# Patient Record
Sex: MALE | Race: OTHER | NOT HISPANIC OR LATINO
[De-identification: names, ages, dates, MRNs, and addresses within clinical notes are randomized per-mention and may not be internally consistent; named-entity substitution may affect disease eponyms.]

---

## 2021-05-06 ENCOUNTER — APPOINTMENT (OUTPATIENT)
Dept: INTERNAL MEDICINE | Facility: CLINIC | Age: 76
End: 2021-05-06
Payer: MEDICARE

## 2021-05-06 VITALS
DIASTOLIC BLOOD PRESSURE: 78 MMHG | HEART RATE: 68 BPM | WEIGHT: 120 LBS | SYSTOLIC BLOOD PRESSURE: 140 MMHG | TEMPERATURE: 97.9 F | OXYGEN SATURATION: 97 % | HEIGHT: 70 IN | BODY MASS INDEX: 17.18 KG/M2

## 2021-05-06 DIAGNOSIS — K22.70 BARRETT'S ESOPHAGUS W/OUT DYSPLASIA: ICD-10-CM

## 2021-05-06 DIAGNOSIS — Z87.891 PERSONAL HISTORY OF NICOTINE DEPENDENCE: ICD-10-CM

## 2021-05-06 DIAGNOSIS — Z80.9 FAMILY HISTORY OF MALIGNANT NEOPLASM, UNSPECIFIED: ICD-10-CM

## 2021-05-06 DIAGNOSIS — Z78.9 OTHER SPECIFIED HEALTH STATUS: ICD-10-CM

## 2021-05-06 DIAGNOSIS — Z86.79 PERSONAL HISTORY OF OTHER DISEASES OF THE CIRCULATORY SYSTEM: ICD-10-CM

## 2021-05-06 PROCEDURE — 93000 ELECTROCARDIOGRAM COMPLETE: CPT

## 2021-05-06 PROCEDURE — 99204 OFFICE O/P NEW MOD 45 MIN: CPT

## 2021-05-06 RX ORDER — TIZANIDINE 2 MG/1
2 TABLET ORAL
Refills: 0 | Status: ACTIVE | COMMUNITY

## 2021-05-06 NOTE — PHYSICAL EXAM
[No Edema] : there was no peripheral edema [Soft] : abdomen soft [Non Tender] : non-tender [Normal] : affect was normal and insight and judgment were intact [de-identified] : ostomy with urine in place  normal stoma   [de-identified] : kyphosis present

## 2021-05-06 NOTE — REVIEW OF SYSTEMS
[Hearing Loss] : hearing loss [Easy Bruising] : easy bruising [Negative] : Psychiatric [FreeTextEntry8] : has an ostomy

## 2021-05-06 NOTE — HISTORY OF PRESENT ILLNESS
[de-identified] : This is a 77 yo M with hx of atrial fibrillation (s/p ablation), bladder ca with ostomy (2018). Prostate CA s/p seed implant. 2010\par Hx of barretts esophagus - following with Dr Mendieta\par LBP- takes tizanadine and melloxicam\par \par Onc  Dr Cristi Summers.   s/p chemo and immunotherapy. part of a clinical trial.  \par , worked in finance, worked for [employer]\par exercises daily. no issues with sob or cp.\par Quit tob year his son was born.  Used to drink heavily.\par Constipation - takes miralax and metamucil\par Altagracia Askew  -[phone] - performs the labs

## 2021-05-06 NOTE — PLAN
[FreeTextEntry1] : 75 yo M with hx of AF, bladder ca, prostate ca\par labs to be done with his oncologist\par EKG NSR\par \par once labs reviewed - to send report to Dr Mendieta.  \par try to limit Meloxicam use.\par continue PT for his back pain\par Get old records for review

## 2021-05-14 ENCOUNTER — NON-APPOINTMENT (OUTPATIENT)
Age: 76
End: 2021-05-14

## 2021-06-02 ENCOUNTER — NON-APPOINTMENT (OUTPATIENT)
Age: 76
End: 2021-06-02

## 2021-06-03 ENCOUNTER — NON-APPOINTMENT (OUTPATIENT)
Age: 76
End: 2021-06-03

## 2021-06-15 ENCOUNTER — APPOINTMENT (OUTPATIENT)
Dept: INTERNAL MEDICINE | Facility: CLINIC | Age: 76
End: 2021-06-15
Payer: MEDICARE

## 2021-06-15 VITALS
HEIGHT: 70 IN | TEMPERATURE: 97.4 F | BODY MASS INDEX: 16.46 KG/M2 | WEIGHT: 115 LBS | DIASTOLIC BLOOD PRESSURE: 66 MMHG | OXYGEN SATURATION: 97 % | HEART RATE: 76 BPM | SYSTOLIC BLOOD PRESSURE: 118 MMHG

## 2021-06-15 DIAGNOSIS — R21 RASH AND OTHER NONSPECIFIC SKIN ERUPTION: ICD-10-CM

## 2021-06-15 DIAGNOSIS — K59.09 OTHER CONSTIPATION: ICD-10-CM

## 2021-06-15 PROCEDURE — 99214 OFFICE O/P EST MOD 30 MIN: CPT

## 2021-06-15 RX ORDER — MUPIROCIN 20 MG/G
2 OINTMENT TOPICAL 3 TIMES DAILY
Qty: 1 | Refills: 2 | Status: ACTIVE | COMMUNITY
Start: 2021-06-15 | End: 1900-01-01

## 2021-06-15 NOTE — PHYSICAL EXAM
[No Edema] : there was no peripheral edema [Soft] : abdomen soft [Non Tender] : non-tender [Normal] : affect was normal and insight and judgment were intact [de-identified] : ostomy with urine in place  normal stoma   [de-identified] : kyphosis present [de-identified] : left inner calf 1 cm area of erythema with central white raised clearing

## 2021-06-15 NOTE — HISTORY OF PRESENT ILLNESS
[de-identified] : This is a 75 yo M with hx of atrial fibrillation (s/p ablation), bladder ca with ostomy (2018). Prostate CA s/p seed implant. 2010\par Hx of barretts esophagus - \par colonoscopy performed with Dr Mendieta- has known diverticulitis. has been stable-  repeat in 5-10 years.\par Wife has mild neurocognitive d/o- being treated at [facility].\par recently more fatigue.  \par recent labs from 5/20 reviewed - only out of range was Cr of 1.33\par walking and using some weights - BUT wieght has gone down.  10 lbs over one year.\par At [place] clinical trial with Dr Summers.  had chemo and imunotherapy.\par taking miralax daily for constipation and metamucil.\par

## 2021-06-15 NOTE — PLAN
[FreeTextEntry1] : 77 yo M with hx of AF, bladder ca, prostate ca\par with weight loss  - add one can ensure to regimen daily.  \par \par Labs reviewed.\par Constipation- cont miralax and metamucil\par leg cramping - continue magnseium oxide\par lbp- cont tizanidine prn\par \par continue PT for his back pain\par \par left leg rash0 trial of mupirocin and f/up Dr Javier dermatologist.

## 2021-10-11 ENCOUNTER — APPOINTMENT (OUTPATIENT)
Dept: INTERNAL MEDICINE | Facility: CLINIC | Age: 76
End: 2021-10-11
Payer: MEDICARE

## 2021-10-11 VITALS
DIASTOLIC BLOOD PRESSURE: 66 MMHG | SYSTOLIC BLOOD PRESSURE: 126 MMHG | HEIGHT: 70 IN | HEART RATE: 74 BPM | OXYGEN SATURATION: 96 % | TEMPERATURE: 98.2 F

## 2021-10-11 DIAGNOSIS — Z23 ENCOUNTER FOR IMMUNIZATION: ICD-10-CM

## 2021-10-11 DIAGNOSIS — Z85.46 PERSONAL HISTORY OF MALIGNANT NEOPLASM OF PROSTATE: ICD-10-CM

## 2021-10-11 PROCEDURE — 99212 OFFICE O/P EST SF 10 MIN: CPT

## 2021-10-11 PROCEDURE — G0008: CPT

## 2021-10-11 PROCEDURE — 90662 IIV NO PRSV INCREASED AG IM: CPT

## 2021-10-11 NOTE — HISTORY OF PRESENT ILLNESS
[de-identified] : This is a 75 yo M with hx of atrial fibrillation (s/p ablation), bladder ca with ostomy (2018). Prostate CA s/p seed implant. 2010. neurocognitive d/o\par Hx of barretts esophagus - \par \par here for fluvax\par \par

## 2021-12-01 ENCOUNTER — NON-APPOINTMENT (OUTPATIENT)
Age: 76
End: 2021-12-01

## 2022-04-06 ENCOUNTER — APPOINTMENT (OUTPATIENT)
Dept: INTERNAL MEDICINE | Facility: CLINIC | Age: 77
End: 2022-04-06
Payer: MEDICARE

## 2022-04-06 VITALS
SYSTOLIC BLOOD PRESSURE: 136 MMHG | OXYGEN SATURATION: 96 % | BODY MASS INDEX: 18.96 KG/M2 | TEMPERATURE: 98.2 F | WEIGHT: 118 LBS | DIASTOLIC BLOOD PRESSURE: 78 MMHG | HEART RATE: 74 BPM | HEIGHT: 66 IN

## 2022-04-06 DIAGNOSIS — Z00.00 ENCOUNTER FOR GENERAL ADULT MEDICAL EXAMINATION W/OUT ABNORMAL FINDINGS: ICD-10-CM

## 2022-04-06 DIAGNOSIS — M54.9 DORSALGIA, UNSPECIFIED: ICD-10-CM

## 2022-04-06 PROCEDURE — 36415 COLL VENOUS BLD VENIPUNCTURE: CPT

## 2022-04-06 PROCEDURE — G0439: CPT

## 2022-04-06 PROCEDURE — 93000 ELECTROCARDIOGRAM COMPLETE: CPT

## 2022-04-06 NOTE — PHYSICAL EXAM
[No Edema] : there was no peripheral edema [Soft] : abdomen soft [Non Tender] : non-tender [Normal] : affect was normal and insight and judgment were intact [de-identified] : ostomy with urine in place  normal stoma   [de-identified] : kyphosis present [de-identified] : left inner calf 1 cm area of erythema with central white raised clearing

## 2022-04-06 NOTE — REVIEW OF SYSTEMS
[Fatigue] : fatigue [Hearing Loss] : hearing loss [Easy Bruising] : easy bruising [Negative] : Psychiatric [FreeTextEntry4] : as above [FreeTextEntry8] : has an ostomy

## 2022-04-06 NOTE — HISTORY OF PRESENT ILLNESS
[de-identified] : This is a 75 yo M with hx of atrial fibrillation (s/p ablation), bladder ca with ostomy (2018). Prostate CA s/p seed implant. 2010. neurocognitive d/o\par Hx of barretts esophagus - \par Fatigue -  - last 2 months - not sleeping well, wife with dementia - wanders and gets up, disrupting sleep\par  = some bowie.  no cp.  last saw cardiologist- Dr Villeda - 2+ years.  \par LBP- chronic.  poor posture.   - goes to gym 2 times a week.    uses elliptical - but due to wife's condiiton bowie snot get out as often\par Stinging on his tongue at night\par \par Onc Dr Cristi Summers. s/p chemo and immunotherapy. part of a clinical trial. 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

## 2022-04-06 NOTE — PLAN
[FreeTextEntry1] : 75 yo M with hx of AF, bladder ca, prostate ca\par EKG NSR\par labs today\par \par LBP - encourage more routine exercise and f/up for PT\par Fatigue- check labs today\par  fup Dr Tate at [facility]\par \par Tongue stinging - referral for ENT  \par left leg rash0 trial of mupirocin and f/up Dr Javier dermatologist.

## 2022-04-07 ENCOUNTER — NON-APPOINTMENT (OUTPATIENT)
Age: 77
End: 2022-04-07

## 2022-04-07 LAB
ALBUMIN SERPL ELPH-MCNC: 4.7 G/DL
ALP BLD-CCNC: 111 U/L
ALT SERPL-CCNC: 9 U/L
ANION GAP SERPL CALC-SCNC: 13 MMOL/L
APPEARANCE: CLEAR
AST SERPL-CCNC: 12 U/L
BASOPHILS # BLD AUTO: 0.05 K/UL
BASOPHILS NFR BLD AUTO: 0.6 %
BILIRUB SERPL-MCNC: 0.6 MG/DL
BILIRUBIN URINE: NEGATIVE
BLOOD URINE: NEGATIVE
BUN SERPL-MCNC: 32 MG/DL
CALCIUM SERPL-MCNC: 9.8 MG/DL
CHLORIDE SERPL-SCNC: 107 MMOL/L
CHOLEST SERPL-MCNC: 162 MG/DL
CO2 SERPL-SCNC: 22 MMOL/L
COLOR: NORMAL
CREAT SERPL-MCNC: 1.48 MG/DL
EGFR: 48 ML/MIN/1.73M2
EOSINOPHIL # BLD AUTO: 0.47 K/UL
EOSINOPHIL NFR BLD AUTO: 6 %
ESTIMATED AVERAGE GLUCOSE: 111 MG/DL
GLUCOSE QUALITATIVE U: NEGATIVE
GLUCOSE SERPL-MCNC: 98 MG/DL
HBA1C MFR BLD HPLC: 5.5 %
HCT VFR BLD CALC: 38.4 %
HDLC SERPL-MCNC: 51 MG/DL
HGB BLD-MCNC: 12.3 G/DL
IMM GRANULOCYTES NFR BLD AUTO: 0.5 %
KETONES URINE: NEGATIVE
LDLC SERPL CALC-MCNC: 99 MG/DL
LEUKOCYTE ESTERASE URINE: ABNORMAL
LYMPHOCYTES # BLD AUTO: 1.98 K/UL
LYMPHOCYTES NFR BLD AUTO: 25.5 %
MAN DIFF?: NORMAL
MCHC RBC-ENTMCNC: 32 GM/DL
MCHC RBC-ENTMCNC: 32.2 PG
MCV RBC AUTO: 100.5 FL
MONOCYTES # BLD AUTO: 0.74 K/UL
MONOCYTES NFR BLD AUTO: 9.5 %
NEUTROPHILS # BLD AUTO: 4.49 K/UL
NEUTROPHILS NFR BLD AUTO: 57.9 %
NITRITE URINE: NEGATIVE
NONHDLC SERPL-MCNC: 111 MG/DL
PH URINE: 8.5
PLATELET # BLD AUTO: 220 K/UL
POTASSIUM SERPL-SCNC: 4.7 MMOL/L
PROT SERPL-MCNC: 7.8 G/DL
PROTEIN URINE: ABNORMAL
RBC # BLD: 3.82 M/UL
RBC # FLD: 12.9 %
SODIUM SERPL-SCNC: 141 MMOL/L
SPECIFIC GRAVITY URINE: 1.02
TRIGL SERPL-MCNC: 62 MG/DL
TSH SERPL-ACNC: 2.41 UIU/ML
UROBILINOGEN URINE: NORMAL
WBC # FLD AUTO: 7.77 K/UL

## 2022-04-11 ENCOUNTER — LABORATORY RESULT (OUTPATIENT)
Age: 77
End: 2022-04-11

## 2022-04-14 ENCOUNTER — APPOINTMENT (OUTPATIENT)
Dept: OTOLARYNGOLOGY | Facility: CLINIC | Age: 77
End: 2022-04-14
Payer: MEDICARE

## 2022-04-14 VITALS
TEMPERATURE: 98.3 F | HEART RATE: 72 BPM | SYSTOLIC BLOOD PRESSURE: 134 MMHG | RESPIRATION RATE: 16 BRPM | HEIGHT: 66 IN | OXYGEN SATURATION: 99 % | DIASTOLIC BLOOD PRESSURE: 79 MMHG | WEIGHT: 118 LBS | BODY MASS INDEX: 18.96 KG/M2

## 2022-04-14 PROCEDURE — 99203 OFFICE O/P NEW LOW 30 MIN: CPT

## 2022-04-14 NOTE — PHYSICAL EXAM
[Normal] : assessment of respiratory effort is normal [de-identified] : looks like he is biting his tongue - slight divot with submucosal hemorrhage r lateral tongue 3 mm area [de-identified] : some implants some posts some missing teeth [de-identified] : gait steady

## 2022-04-14 NOTE — ASSESSMENT
[FreeTextEntry1] : It apepars he is biting his tongue in his sleep\par advised to try to change sleeping position, hydrate and see dentist to complete/adjust dental work\par rtc 1 mo to recheck - does not look neoplastic and it is not raised, but if persists may need bx

## 2022-04-14 NOTE — HISTORY OF PRESENT ILLNESS
[de-identified] : 76 yo m with r tongue pain intermittently for a month. It happens when he is sleeping - it comes on sharply, wakes him up and cools down over 1-2 minutes. He is a nonsmoker x 45 yrs. 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 No inciting event. He has h/o extensive dental work, implants and 2 exposed posts on posterior mandible on r. used peridex with no improvement. no fh or sh relevant to cc

## 2022-04-18 ENCOUNTER — RESULT REVIEW (OUTPATIENT)
Age: 77
End: 2022-04-18

## 2022-04-18 ENCOUNTER — RX RENEWAL (OUTPATIENT)
Age: 77
End: 2022-04-18

## 2022-04-18 LAB
ALBUMIN MFR SERPL ELPH: 54.6 %
ALBUMIN SERPL-MCNC: 4.3 G/DL
ALBUMIN/GLOB SERPL: 1.2 RATIO
ALBUPE: 17.3 %
ALPHA1 GLOB MFR SERPL ELPH: 4.5 %
ALPHA1 GLOB SERPL ELPH-MCNC: 0.4 G/DL
ALPHA1UPE: 18.8 %
ALPHA2 GLOB MFR SERPL ELPH: 10 %
ALPHA2 GLOB SERPL ELPH-MCNC: 0.8 G/DL
ALPHA2UPE: 27 %
ANION GAP SERPL CALC-SCNC: 15 MMOL/L
APPEARANCE: ABNORMAL
B-GLOBULIN MFR SERPL ELPH: 11.9 %
B-GLOBULIN SERPL ELPH-MCNC: 0.9 G/DL
BETAUPE: 14.5 %
BILIRUBIN URINE: NEGATIVE
BLOOD URINE: NEGATIVE
BUN SERPL-MCNC: 26 MG/DL
CALCIUM SERPL-MCNC: 9.8 MG/DL
CHLORIDE SERPL-SCNC: 104 MMOL/L
CO2 SERPL-SCNC: 22 MMOL/L
COLOR: YELLOW
CREAT 24H UR-MCNC: NORMAL G/24 H
CREAT SERPL-MCNC: 1.55 MG/DL
CREAT SPEC-SCNC: 125 MG/DL
CREAT/PROT UR: 0.4 RATIO
CREATININE UR (MAYO): 126 MG/DL
EGFR: 46 ML/MIN/1.73M2
GAMMA GLOB FLD ELPH-MCNC: 1.5 G/DL
GAMMA GLOB MFR SERPL ELPH: 19 %
GAMMAUPE: 22.4 %
GLUCOSE QUALITATIVE U: NEGATIVE
GLUCOSE SERPL-MCNC: 104 MG/DL
IGA 24H UR QL IFE: NORMAL
INTERPRETATION SERPL IEP-IMP: NORMAL
KAPPA LC 24H UR QL: NORMAL
KETONES URINE: NEGATIVE
LEUKOCYTE ESTERASE URINE: ABNORMAL
M PROTEIN SPEC IFE-MCNC: NORMAL
NITRITE URINE: NEGATIVE
PH URINE: 9
POTASSIUM SERPL-SCNC: 4.6 MMOL/L
PROT PATTERN 24H UR ELPH-IMP: NORMAL
PROT SERPL-MCNC: 7.8 G/DL
PROT SERPL-MCNC: 7.8 G/DL
PROT UR-MCNC: 45 MG/DL
PROT UR-MCNC: 51 MG/DL
PROT UR-MCNC: 51 MG/DL
PROTEIN URINE: ABNORMAL
SODIUM SERPL-SCNC: 141 MMOL/L
SPECIFIC GRAVITY URINE: 1.02
SPECIMEN VOL 24H UR: NORMAL ML
UROBILINOGEN URINE: NORMAL

## 2022-05-17 ENCOUNTER — APPOINTMENT (OUTPATIENT)
Dept: ULTRASOUND IMAGING | Facility: HOSPITAL | Age: 77
End: 2022-05-17
Payer: MEDICARE

## 2022-05-17 ENCOUNTER — OUTPATIENT (OUTPATIENT)
Dept: OUTPATIENT SERVICES | Facility: HOSPITAL | Age: 77
LOS: 1 days | End: 2022-05-17
Payer: MEDICARE

## 2022-05-17 PROCEDURE — 76775 US EXAM ABDO BACK WALL LIM: CPT

## 2022-05-17 PROCEDURE — 76775 US EXAM ABDO BACK WALL LIM: CPT | Mod: 26

## 2022-05-19 ENCOUNTER — NON-APPOINTMENT (OUTPATIENT)
Age: 77
End: 2022-05-19

## 2022-05-19 RX ORDER — MELOXICAM 15 MG/1
15 TABLET ORAL
Qty: 30 | Refills: 5 | Status: DISCONTINUED | COMMUNITY
Start: 1900-01-01 | End: 2022-05-19

## 2022-05-23 ENCOUNTER — APPOINTMENT (OUTPATIENT)
Dept: OTOLARYNGOLOGY | Facility: CLINIC | Age: 77
End: 2022-05-23
Payer: MEDICARE

## 2022-05-23 VITALS
DIASTOLIC BLOOD PRESSURE: 73 MMHG | RESPIRATION RATE: 16 BRPM | BODY MASS INDEX: 19.29 KG/M2 | HEIGHT: 66 IN | TEMPERATURE: 98.2 F | HEART RATE: 91 BPM | WEIGHT: 120 LBS | SYSTOLIC BLOOD PRESSURE: 148 MMHG | OXYGEN SATURATION: 98 %

## 2022-05-23 DIAGNOSIS — H61.20 IMPACTED CERUMEN, UNSPECIFIED EAR: ICD-10-CM

## 2022-05-23 PROCEDURE — 99213 OFFICE O/P EST LOW 20 MIN: CPT | Mod: 25

## 2022-05-23 PROCEDURE — 69210 REMOVE IMPACTED EAR WAX UNI: CPT

## 2022-05-23 NOTE — PHYSICAL EXAM
[Normal] : lingual tonsils are normal [Midline] : trachea located in midline position [de-identified] : b copious cerumen removed atraumatically with suction, TMs intact  [de-identified] : improvement, no longer biting tongue  [de-identified] : some implants some missing teeth  [de-identified] : gait steady

## 2022-05-23 NOTE — ASSESSMENT
[FreeTextEntry1] : 1. glossitis\par -improved\par -rec he continues to stay well hydrated\par 2. b cerumen impaction \par -cerumen removed\par -ears feel better\par -Avoid Qtip usage\par -debrox drops, instructed how to use \par RTC as needed

## 2022-05-23 NOTE — HISTORY OF PRESENT ILLNESS
[de-identified] : 1 month follow up for this 76 y/o M with r tongue pain. He is c/o occasional tongue pain improved since last visit but not 100%. It has woken him up twice since his initial visit, which he states is an improvement. He has been trying to stay well hydrated. He has stage three kidney disease. He saw dentist who shaved down one of his teeth and the episodes  of pain decreased significantly.

## 2022-05-25 ENCOUNTER — APPOINTMENT (OUTPATIENT)
Dept: OTOLARYNGOLOGY | Facility: CLINIC | Age: 77
End: 2022-05-25
Payer: MEDICARE

## 2022-05-25 VITALS
HEART RATE: 75 BPM | RESPIRATION RATE: 18 BRPM | SYSTOLIC BLOOD PRESSURE: 144 MMHG | WEIGHT: 120 LBS | HEIGHT: 66 IN | TEMPERATURE: 98.4 F | BODY MASS INDEX: 19.29 KG/M2 | DIASTOLIC BLOOD PRESSURE: 74 MMHG | OXYGEN SATURATION: 100 %

## 2022-05-25 DIAGNOSIS — K14.0 GLOSSITIS: ICD-10-CM

## 2022-05-25 PROCEDURE — 99213 OFFICE O/P EST LOW 20 MIN: CPT

## 2022-05-25 NOTE — ASSESSMENT
[FreeTextEntry1] : exam is nl but from history it appears he may be biting anterior tongue rather than posterior\par advised to see dentist again for mouth guard\par he agreed\par asked to followup if it recurs with guard in place

## 2022-05-25 NOTE — HISTORY OF PRESENT ILLNESS
[de-identified] : 2 d followup for this 78 yo man who it appeared had been biting tongue waking him up at night - had tooth filed down and it improved. he called and said it recurred yesterday so he was asked to come in. -f.s.c

## 2022-06-24 ENCOUNTER — APPOINTMENT (OUTPATIENT)
Dept: INTERNAL MEDICINE | Facility: CLINIC | Age: 77
End: 2022-06-24
Payer: MEDICARE

## 2022-06-24 ENCOUNTER — APPOINTMENT (OUTPATIENT)
Dept: INTERNAL MEDICINE | Facility: CLINIC | Age: 77
End: 2022-06-24

## 2022-06-24 VITALS
BODY MASS INDEX: 18.96 KG/M2 | RESPIRATION RATE: 18 BRPM | HEART RATE: 69 BPM | DIASTOLIC BLOOD PRESSURE: 80 MMHG | TEMPERATURE: 97.6 F | SYSTOLIC BLOOD PRESSURE: 140 MMHG | OXYGEN SATURATION: 97 % | HEIGHT: 66 IN | WEIGHT: 118 LBS

## 2022-06-24 PROCEDURE — 99213 OFFICE O/P EST LOW 20 MIN: CPT

## 2022-06-24 RX ORDER — OMEPRAZOLE 20 MG/1
20 TABLET, DELAYED RELEASE ORAL
Refills: 0 | Status: DISCONTINUED | COMMUNITY
End: 2022-06-24

## 2022-06-24 NOTE — HISTORY OF PRESENT ILLNESS
[de-identified] : This is a 78 yo M with hx of atrial fibrillation (s/p ablation), bladder ca with ostomy (2018). Prostate CA s/p seed implant. 2010. partial right nephrectomy for RCC,  CKD 3\par \par recent BRANNON  with imrpvoement\par imaging revealed mild hydro\par \par seeing Dr Villeda - cardiology next week\par  has forwarded US to his urologist.\par Onc Dr Cristi Summers. s/p chemo and immunotherapy. part of a clinical trial. 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

## 2022-06-24 NOTE — PLAN
[FreeTextEntry1] : CKD 3 - stable - for repeat las in 3--4 months\par mild right hydro -deepak f/up with

## 2022-08-17 ENCOUNTER — APPOINTMENT (OUTPATIENT)
Dept: INTERNAL MEDICINE | Facility: CLINIC | Age: 77
End: 2022-08-17

## 2023-02-02 ENCOUNTER — APPOINTMENT (OUTPATIENT)
Dept: INTERNAL MEDICINE | Facility: CLINIC | Age: 78
End: 2023-02-02
Payer: MEDICARE

## 2023-02-02 VITALS
HEIGHT: 67 IN | BODY MASS INDEX: 17.89 KG/M2 | WEIGHT: 114 LBS | SYSTOLIC BLOOD PRESSURE: 134 MMHG | DIASTOLIC BLOOD PRESSURE: 78 MMHG

## 2023-02-02 DIAGNOSIS — R26.89 OTHER ABNORMALITIES OF GAIT AND MOBILITY: ICD-10-CM

## 2023-02-02 DIAGNOSIS — N18.30 CHRONIC KIDNEY DISEASE, STAGE 3 UNSPECIFIED: ICD-10-CM

## 2023-02-02 DIAGNOSIS — R53.82 CHRONIC FATIGUE, UNSPECIFIED: ICD-10-CM

## 2023-02-02 DIAGNOSIS — C67.9 MALIGNANT NEOPLASM OF BLADDER, UNSPECIFIED: ICD-10-CM

## 2023-02-02 PROCEDURE — 36415 COLL VENOUS BLD VENIPUNCTURE: CPT

## 2023-02-02 PROCEDURE — 99214 OFFICE O/P EST MOD 30 MIN: CPT

## 2023-02-02 NOTE — HISTORY OF PRESENT ILLNESS
[de-identified] : This is a 78 yo M with hx of atrial fibrillation (s/p ablation), bladder ca with ostomy (2018). Prostate CA s/p seed implant. 2010. partial right nephrectomy for RCC,  CKD 3\par - fatigue remains an issue - taking care fo his wife who has dementia.- both of them not sleeping., not going to gym, does not want to leave her alone.\par - she is following with neuro at [facility]\par - Memory concens- he is also seeing neuro at [facility] seeing Dr Villeda - cardiology - no issues\par  urologist.Dr Irving fax [phone]\par Onc Dr Cristi Summers. s/p chemo and immunotherapy. part of a clinical trial. 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

## 2023-02-02 NOTE — REVIEW OF SYSTEMS
[Fatigue] : fatigue [Hearing Loss] : hearing loss [Easy Bruising] : easy bruising [Negative] : Psychiatric [Memory Loss] : memory loss [FreeTextEntry4] : as above [FreeTextEntry8] : has an ostomy

## 2023-02-02 NOTE — PLAN
[FreeTextEntry1] : CKD 3 - stable - repeat labs today\par condier adding sglt2 i  for the future\par \par balance issues -  referral for PT\par \par Memory concerns-  f/up Dr Rockwell at [facility]

## 2023-02-03 LAB
ALBUMIN SERPL ELPH-MCNC: 4.8 G/DL
ALP BLD-CCNC: 113 U/L
ALT SERPL-CCNC: 15 U/L
ANION GAP SERPL CALC-SCNC: 13 MMOL/L
AST SERPL-CCNC: 14 U/L
BASOPHILS # BLD AUTO: 0.04 K/UL
BASOPHILS NFR BLD AUTO: 0.6 %
BILIRUB SERPL-MCNC: 0.6 MG/DL
BUN SERPL-MCNC: 21 MG/DL
CALCIUM SERPL-MCNC: 10.3 MG/DL
CHLORIDE SERPL-SCNC: 104 MMOL/L
CO2 SERPL-SCNC: 24 MMOL/L
CREAT SERPL-MCNC: 1.22 MG/DL
CREAT SPEC-SCNC: 106 MG/DL
CREAT/PROT UR: 1 RATIO
EGFR: 61 ML/MIN/1.73M2
EOSINOPHIL # BLD AUTO: 0.2 K/UL
EOSINOPHIL NFR BLD AUTO: 2.8 %
GLUCOSE SERPL-MCNC: 123 MG/DL
HCT VFR BLD CALC: 43.1 %
HGB BLD-MCNC: 13.8 G/DL
IMM GRANULOCYTES NFR BLD AUTO: 0.3 %
LYMPHOCYTES # BLD AUTO: 1.54 K/UL
LYMPHOCYTES NFR BLD AUTO: 21.7 %
MAGNESIUM SERPL-MCNC: 2.1 MG/DL
MAN DIFF?: NORMAL
MCHC RBC-ENTMCNC: 32 GM/DL
MCHC RBC-ENTMCNC: 32.2 PG
MCV RBC AUTO: 100.5 FL
MONOCYTES # BLD AUTO: 0.56 K/UL
MONOCYTES NFR BLD AUTO: 7.9 %
NEUTROPHILS # BLD AUTO: 4.74 K/UL
NEUTROPHILS NFR BLD AUTO: 66.7 %
PHOSPHATE SERPL-MCNC: 3.3 MG/DL
PLATELET # BLD AUTO: 222 K/UL
POTASSIUM SERPL-SCNC: 4.6 MMOL/L
PROT SERPL-MCNC: 7.8 G/DL
PROT UR-MCNC: 101 MG/DL
RBC # BLD: 4.29 M/UL
RBC # FLD: 13.2 %
SODIUM SERPL-SCNC: 141 MMOL/L
URATE SERPL-MCNC: 4.7 MG/DL
WBC # FLD AUTO: 7.1 K/UL

## 2023-02-06 LAB
APPEARANCE: ABNORMAL
BACTERIA: ABNORMAL
BILIRUBIN URINE: ABNORMAL
BLOOD URINE: NEGATIVE
COLOR: YELLOW
GLUCOSE QUALITATIVE U: NEGATIVE
HYALINE CASTS: 2 /LPF
KETONES URINE: NEGATIVE
LEUKOCYTE ESTERASE URINE: NEGATIVE
MICROSCOPIC-UA: NORMAL
NITRITE URINE: NEGATIVE
PH URINE: >8.9
PROTEIN URINE: ABNORMAL
RED BLOOD CELLS URINE: 2 /HPF
SPECIFIC GRAVITY URINE: 1
SQUAMOUS EPITHELIAL CELLS: 2 /HPF
TRIPLE PHOSPHATE CRYSTALS: ABNORMAL
URINE COMMENTS: NORMAL
UROBILINOGEN URINE: NORMAL
VIT B12 SERPL-MCNC: 643 PG/ML
WHITE BLOOD CELLS URINE: 4 /HPF

## 2023-04-02 ENCOUNTER — RX RENEWAL (OUTPATIENT)
Age: 78
End: 2023-04-02

## 2023-05-03 ENCOUNTER — NON-APPOINTMENT (OUTPATIENT)
Age: 78
End: 2023-05-03

## 2023-05-03 ENCOUNTER — APPOINTMENT (OUTPATIENT)
Dept: INTERNAL MEDICINE | Facility: CLINIC | Age: 78
End: 2023-05-03

## 2023-08-10 ENCOUNTER — APPOINTMENT (OUTPATIENT)
Dept: INTERNAL MEDICINE | Facility: CLINIC | Age: 78
End: 2023-08-10

## 2023-09-05 ENCOUNTER — RX RENEWAL (OUTPATIENT)
Age: 78
End: 2023-09-05

## 2023-09-05 RX ORDER — FAMOTIDINE 40 MG/1
40 TABLET, FILM COATED ORAL DAILY
Qty: 90 | Refills: 2 | Status: ACTIVE | COMMUNITY
Start: 2022-04-18 | End: 1900-01-01